# Patient Record
(demographics unavailable — no encounter records)

---

## 2024-11-07 NOTE — PHYSICAL EXAM
[No Acute Distress] : no acute distress [Normal Oropharynx] : normal oropharynx [I] : Mallampati Class: I [Normal Rate/Rhythm] : normal rate/rhythm [Normal S1, S2] : normal s1, s2 [No Murmurs] : no murmurs [No Resp Distress] : no resp distress [No Acc Muscle Use] : no acc muscle use [Clear to Auscultation Bilaterally] : clear to auscultation bilaterally [No Clubbing] : no clubbing [No Cyanosis] : no cyanosis [No Edema] : no edema [No Focal Deficits] : no focal deficits [Oriented x3] : oriented x3 [Normal Affect] : normal affect

## 2024-11-07 NOTE — HISTORY OF PRESENT ILLNESS
[Never] : never [TextBox_4] :  Ms. KIESHA RIVERS is a 64 year old F here for evaluation of persistent cough.  She has had a chronic cough for the past 6 years. Cough came on suddenly but without obvious trigger. Cough is usually dry and is not associated with chest pain, pleurisy, hemoptysis, shortness of breath, or wheezing. It was initially thought to be 2/2 ACE-I as she was on lisinopril at the time. However, cough persisted. She was then evaluated by ENT and underwent laryngoscopy and was told she may have some GERD so was started on treatment for GERD with minimal change in her cough. She did note that certain foods worsened the cough but not always. She also notes that sometimes a sneezing fit could trigger a coughing fit. She does not have severe seasonal allergies and no h/o childhood asthma. She has never smoked and works in a [employer]. She has no significant occupational or personal exposures. She does not have significant FHx of lung disease.

## 2024-11-07 NOTE — DISCUSSION/SUMMARY
[FreeTextEntry1] : 64F with HTN here for evaluation of persistent cough for the past 6 yrs  #Persistent cough  - She had coronary Ca score imaging done at outside facility 2/2024. She brought in report; Ca score is 0 but there was no mention of non-cardiac structures so unclear if there is any parenchymal lung issues. She will bring me the CD of the images to review - We discussed DDx of chronic cough and will check PFTs now to evaluate for airways disease - declined flu shot

## 2025-02-05 NOTE — DISCUSSION/SUMMARY
[FreeTextEntry1] : 65F with HTN here for evaluation of persistent cough for the past 6 yrs  #Persistent cough  - Cough is controlled with postnasal drip therapy - PFTs reviewed with patient. Overall normal study and low FeNO. I have low suspicion for asthma at this time - declined flu shot

## 2025-02-05 NOTE — HISTORY OF PRESENT ILLNESS
[Never] : never [TextBox_4] :  Ms. KIESHA RIVERS is a 64 year old F here for evaluation of persistent cough.  She has had a chronic cough for the past 6 years. Cough came on suddenly but without obvious trigger. Cough is usually dry and is not associated with chest pain, pleurisy, hemoptysis, shortness of breath, or wheezing. It was initially thought to be 2/2 ACE-I as she was on lisinopril at the time. However, cough persisted. She was then evaluated by ENT and underwent laryngoscopy and was told she may have some GERD so was started on treatment for GERD with minimal change in her cough. She did note that certain foods worsened the cough but not always. She also notes that sometimes a sneezing fit could trigger a coughing fit. She does not have severe seasonal allergies and no h/o childhood asthma. She has never smoked and works in a [employer]. She has no significant occupational or personal exposures. She does not have significant FHx of lung disease.  2/2025 Ms. RIVERS returns today for follow-up PFTs. Cough improved since last visit and notes that it usually gets worse when postnasal drip gets worse. She notes that the indoor heat/dry air worsens it. She uses flonase and antihistamines for treatment.